# Patient Record
Sex: FEMALE | URBAN - METROPOLITAN AREA
[De-identification: names, ages, dates, MRNs, and addresses within clinical notes are randomized per-mention and may not be internally consistent; named-entity substitution may affect disease eponyms.]

---

## 2021-10-07 ENCOUNTER — APPOINTMENT (OUTPATIENT)
Dept: URBAN - METROPOLITAN AREA CLINIC 265 | Age: 36
Setting detail: DERMATOLOGY
End: 2021-10-24

## 2021-10-07 VITALS — HEIGHT: 64 IN | WEIGHT: 219 LBS | RESPIRATION RATE: 18 BRPM

## 2021-10-07 DIAGNOSIS — L23.3 ALLERGIC CONTACT DERMATITIS DUE TO DRUGS IN CONTACT WITH SKIN: ICD-10-CM

## 2021-10-07 PROBLEM — L30.9 DERMATITIS, UNSPECIFIED: Status: ACTIVE | Noted: 2021-10-07

## 2021-10-07 PROCEDURE — OTHER COUNSELING: OTHER

## 2021-10-07 PROCEDURE — OTHER PRESCRIPTION: OTHER

## 2021-10-07 PROCEDURE — OTHER MIPS QUALITY: OTHER

## 2021-10-07 PROCEDURE — OTHER MEDICATION COUNSELING: OTHER

## 2021-10-07 PROCEDURE — 99203 OFFICE O/P NEW LOW 30 MIN: CPT

## 2021-10-07 PROCEDURE — OTHER ADDITIONAL NOTES: OTHER

## 2021-10-07 PROCEDURE — OTHER PRESCRIPTION MEDICATION MANAGEMENT: OTHER

## 2021-10-07 RX ORDER — TRIAMCINOLONE ACETONIDE 1 MG/G
CREAM TOPICAL
Qty: 45 | Refills: 1 | Status: ERX | COMMUNITY
Start: 2021-10-07

## 2021-10-07 ASSESSMENT — LOCATION ZONE DERM: LOCATION ZONE: TRUNK

## 2021-10-07 ASSESSMENT — LOCATION DETAILED DESCRIPTION DERM: LOCATION DETAILED: SUPERIOR LUMBAR SPINE

## 2021-10-07 ASSESSMENT — LOCATION SIMPLE DESCRIPTION DERM: LOCATION SIMPLE: LOWER BACK

## 2021-10-07 NOTE — HPI: RASH
What Type Of Note Output Would You Prefer (Optional)?: Bullet Format
How Severe Is Your Rash?: moderate
Is This A New Presentation, Or A Follow-Up?: Rash
Additional History: 2nd COVID shot 2 months ago since then enlarged lymph nodes and rashes ,burning ,itching all over \\nKenalog injection Monday before last 9/27/21\\nTriamcinolone \\nPrednisone x 3 days \\nRheumatoid Arthritis\\nDr.Anay going to do surgery Iliac vein  has collapsed wants to place a Stent

## 2021-10-07 NOTE — PROCEDURE: MEDICATION COUNSELING
From: Palmer Suero      Created: 9/29/2021 1:05 PM        Refills have been requested for the following medications:         traMADol (ULTRAM) 50 MG tablet [RODNEY Vargas CNP]         traMADol (ULTRAM-ER) 200 MG 24 hr tablet [RODNEY Vargas CNP]     Preferred pharmacy: [facility] [address] [place] [address]           Clindamycin Counseling: I counseled the patient regarding use of clindamycin as an antibiotic for prophylactic and/or therapeutic purposes. Clindamycin is active against numerous classes of bacteria, including skin bacteria. Side effects may include nausea, diarrhea, gastrointestinal upset, rash, hives, yeast infections, and in rare cases, colitis.

## 2021-10-07 NOTE — PROCEDURE: PRESCRIPTION MEDICATION MANAGEMENT
Initiate Treatment: Zyrtec am \\nBenadryl pm
Detail Level: Zone
Render In Strict Bullet Format?: No
Plan: Patient fine to proceed with surgery
Continue Regimen: Triamcinolone Cream twice a day x 14 days as needed for rash

## 2021-10-07 NOTE — PROCEDURE: ADDITIONAL NOTES
Render Risk Assessment In Note?: no
Additional Notes: Rash is resolving nicely.  She did receive a Kenolog injection which seems to have improved overall symptomology.  We will give her a topical corticosteroid to treat residual inflammation.  No known contraindications noted for stent placement.  Instructed patient to keep followup with Dr. Cueva, as previously prescribed.
Detail Level: Zone

## 2021-10-07 NOTE — PROCEDURE: MEDICATION COUNSELING
Xelverónicaz Pregnancy And Lactation Text: This medication is Pregnancy Category D and is not considered safe during pregnancy.  The risk during breast feeding is also uncertain.

## 2021-10-07 NOTE — PROCEDURE: MEDICATION COUNSELING
Astigmatism OU- Rx was given for correction if indicated and requested. Thalidomide Counseling: I discussed with the patient the risks of thalidomide including but not limited to birth defects, anxiety, weakness, chest pain, dizziness, cough and severe allergy.

## 2022-09-14 NOTE — PROCEDURE: MEDICATION COUNSELING
Date Form Received: 9/13/22  Authorization: Yes  Date Sent for Auth:   Type of Form: [place]   Company: [employer] [place] [id number]  When form complete: [phone]  Comment: Msg sent to Ent no forms received    Doxepin Counseling:  Patient advised that the medication is sedating and not to drive a car after taking this medication. Patient informed of potential adverse effects including but not limited to dry mouth, urinary retention, and blurry vision.  The patient verbalized understanding of the proper use and possible adverse effects of doxepin.  All of the patient's questions and concerns were addressed.